# Patient Record
Sex: FEMALE | Race: OTHER | Employment: FULL TIME | URBAN - METROPOLITAN AREA
[De-identification: names, ages, dates, MRNs, and addresses within clinical notes are randomized per-mention and may not be internally consistent; named-entity substitution may affect disease eponyms.]

---

## 2024-05-01 ENCOUNTER — HOSPITAL ENCOUNTER (INPATIENT)
Facility: HOSPITAL | Age: 44
LOS: 2 days | Discharge: HOME | End: 2024-05-03
Attending: EMERGENCY MEDICINE | Admitting: STUDENT IN AN ORGANIZED HEALTH CARE EDUCATION/TRAINING PROGRAM
Payer: MEDICAID

## 2024-05-01 ENCOUNTER — APPOINTMENT (OUTPATIENT)
Dept: CT IMAGING | Age: 44
End: 2024-05-01
Attending: EMERGENCY MEDICINE
Payer: MEDICAID

## 2024-05-01 DIAGNOSIS — K57.20 DIVERTICULITIS OF LARGE INTESTINE WITH ABSCESS WITHOUT BLEEDING: Primary | ICD-10-CM

## 2024-05-01 LAB
ALBUMIN SERPL-MCNC: 3.7 G/DL (ref 3.4–5)
ALBUMIN/GLOB SERPL: 0.8 {RATIO} (ref 1–2)
ALP LIVER SERPL-CCNC: 143 U/L
ALT SERPL-CCNC: 81 U/L
ANION GAP SERPL CALC-SCNC: 5 MMOL/L (ref 0–18)
AST SERPL-CCNC: 61 U/L (ref 15–37)
B-HCG UR QL: NEGATIVE
BASOPHILS # BLD AUTO: 0.05 X10(3) UL (ref 0–0.2)
BASOPHILS NFR BLD AUTO: 0.4 %
BILIRUB SERPL-MCNC: 1.7 MG/DL (ref 0.1–2)
BILIRUB UR QL STRIP.AUTO: NEGATIVE
BUN BLD-MCNC: 7 MG/DL (ref 9–23)
CALCIUM BLD-MCNC: 8.8 MG/DL (ref 8.5–10.1)
CHLORIDE SERPL-SCNC: 108 MMOL/L (ref 98–112)
CLARITY UR REFRACT.AUTO: CLEAR
CO2 SERPL-SCNC: 24 MMOL/L (ref 21–32)
COLOR UR AUTO: YELLOW
CREAT BLD-MCNC: 0.62 MG/DL
EGFRCR SERPLBLD CKD-EPI 2021: 111 ML/MIN/1.73M2 (ref 60–?)
EOSINOPHIL # BLD AUTO: 0.05 X10(3) UL (ref 0–0.7)
EOSINOPHIL NFR BLD AUTO: 0.4 %
ERYTHROCYTE [DISTWIDTH] IN BLOOD BY AUTOMATED COUNT: 12.2 %
GLOBULIN PLAS-MCNC: 4.4 G/DL (ref 2.8–4.4)
GLUCOSE BLD-MCNC: 120 MG/DL (ref 70–99)
GLUCOSE UR STRIP.AUTO-MCNC: NEGATIVE MG/DL
HCT VFR BLD AUTO: 41.1 %
HGB BLD-MCNC: 14.2 G/DL
IMM GRANULOCYTES # BLD AUTO: 0.03 X10(3) UL (ref 0–1)
IMM GRANULOCYTES NFR BLD: 0.3 %
KETONES UR STRIP.AUTO-MCNC: NEGATIVE MG/DL
LEUKOCYTE ESTERASE UR QL STRIP.AUTO: NEGATIVE
LIPASE SERPL-CCNC: 33 U/L (ref 13–75)
LYMPHOCYTES # BLD AUTO: 0.98 X10(3) UL (ref 1–4)
LYMPHOCYTES NFR BLD AUTO: 8.8 %
MCH RBC QN AUTO: 31.3 PG (ref 26–34)
MCHC RBC AUTO-ENTMCNC: 34.5 G/DL (ref 31–37)
MCV RBC AUTO: 90.5 FL
MONOCYTES # BLD AUTO: 0.63 X10(3) UL (ref 0.1–1)
MONOCYTES NFR BLD AUTO: 5.6 %
NEUTROPHILS # BLD AUTO: 9.46 X10 (3) UL (ref 1.5–7.7)
NEUTROPHILS # BLD AUTO: 9.46 X10(3) UL (ref 1.5–7.7)
NEUTROPHILS NFR BLD AUTO: 84.5 %
NITRITE UR QL STRIP.AUTO: NEGATIVE
OSMOLALITY SERPL CALC.SUM OF ELEC: 283 MOSM/KG (ref 275–295)
PH UR STRIP.AUTO: 7 [PH] (ref 5–8)
PLATELET # BLD AUTO: 244 10(3)UL (ref 150–450)
POTASSIUM SERPL-SCNC: 3.7 MMOL/L (ref 3.5–5.1)
PROT SERPL-MCNC: 8.1 G/DL (ref 6.4–8.2)
PROT UR STRIP.AUTO-MCNC: NEGATIVE MG/DL
RBC # BLD AUTO: 4.54 X10(6)UL
SODIUM SERPL-SCNC: 137 MMOL/L (ref 136–145)
SP GR UR STRIP.AUTO: 1.02 (ref 1–1.03)
UROBILINOGEN UR STRIP.AUTO-MCNC: 0.2 MG/DL
WBC # BLD AUTO: 11.2 X10(3) UL (ref 4–11)

## 2024-05-01 PROCEDURE — 99222 1ST HOSP IP/OBS MODERATE 55: CPT | Performed by: STUDENT IN AN ORGANIZED HEALTH CARE EDUCATION/TRAINING PROGRAM

## 2024-05-01 PROCEDURE — 74177 CT ABD & PELVIS W/CONTRAST: CPT | Performed by: EMERGENCY MEDICINE

## 2024-05-01 RX ORDER — HYDROMORPHONE HYDROCHLORIDE 1 MG/ML
0.8 INJECTION, SOLUTION INTRAMUSCULAR; INTRAVENOUS; SUBCUTANEOUS EVERY 2 HOUR PRN
Status: DISCONTINUED | OUTPATIENT
Start: 2024-05-01 | End: 2024-05-03

## 2024-05-01 RX ORDER — LEVOFLOXACIN 5 MG/ML
750 INJECTION, SOLUTION INTRAVENOUS EVERY 24 HOURS
Status: DISCONTINUED | OUTPATIENT
Start: 2024-05-01 | End: 2024-05-03

## 2024-05-01 RX ORDER — POLYETHYLENE GLYCOL 3350 17 G/17G
17 POWDER, FOR SOLUTION ORAL DAILY PRN
Status: DISCONTINUED | OUTPATIENT
Start: 2024-05-01 | End: 2024-05-03

## 2024-05-01 RX ORDER — ACETAMINOPHEN 500 MG
1000 TABLET ORAL EVERY 8 HOURS SCHEDULED
Status: DISCONTINUED | OUTPATIENT
Start: 2024-05-01 | End: 2024-05-03

## 2024-05-01 RX ORDER — ENOXAPARIN SODIUM 100 MG/ML
40 INJECTION SUBCUTANEOUS DAILY
Status: DISCONTINUED | OUTPATIENT
Start: 2024-05-02 | End: 2024-05-03

## 2024-05-01 RX ORDER — ONDANSETRON 2 MG/ML
4 INJECTION INTRAMUSCULAR; INTRAVENOUS EVERY 6 HOURS PRN
Status: DISCONTINUED | OUTPATIENT
Start: 2024-05-01 | End: 2024-05-03

## 2024-05-01 RX ORDER — METRONIDAZOLE 500 MG/100ML
500 INJECTION, SOLUTION INTRAVENOUS EVERY 8 HOURS
Status: DISCONTINUED | OUTPATIENT
Start: 2024-05-01 | End: 2024-05-03

## 2024-05-01 RX ORDER — HYDROMORPHONE HYDROCHLORIDE 1 MG/ML
0.4 INJECTION, SOLUTION INTRAMUSCULAR; INTRAVENOUS; SUBCUTANEOUS EVERY 2 HOUR PRN
Status: DISCONTINUED | OUTPATIENT
Start: 2024-05-01 | End: 2024-05-03

## 2024-05-01 RX ORDER — KETOROLAC TROMETHAMINE 15 MG/ML
15 INJECTION, SOLUTION INTRAMUSCULAR; INTRAVENOUS ONCE
Status: COMPLETED | OUTPATIENT
Start: 2024-05-01 | End: 2024-05-01

## 2024-05-01 RX ORDER — OXYCODONE HYDROCHLORIDE 5 MG/1
5 TABLET ORAL EVERY 4 HOURS PRN
Status: DISCONTINUED | OUTPATIENT
Start: 2024-05-01 | End: 2024-05-03

## 2024-05-01 RX ORDER — MORPHINE SULFATE 4 MG/ML
4 INJECTION, SOLUTION INTRAMUSCULAR; INTRAVENOUS ONCE
Status: COMPLETED | OUTPATIENT
Start: 2024-05-01 | End: 2024-05-01

## 2024-05-01 RX ORDER — OXYCODONE HYDROCHLORIDE 10 MG/1
10 TABLET ORAL EVERY 4 HOURS PRN
Status: DISCONTINUED | OUTPATIENT
Start: 2024-05-01 | End: 2024-05-03

## 2024-05-01 RX ORDER — KETOROLAC TROMETHAMINE 30 MG/ML
30 INJECTION, SOLUTION INTRAMUSCULAR; INTRAVENOUS EVERY 6 HOURS
Status: COMPLETED | OUTPATIENT
Start: 2024-05-01 | End: 2024-05-02

## 2024-05-01 RX ORDER — PROCHLORPERAZINE EDISYLATE 5 MG/ML
5 INJECTION INTRAMUSCULAR; INTRAVENOUS EVERY 8 HOURS PRN
Status: DISCONTINUED | OUTPATIENT
Start: 2024-05-01 | End: 2024-05-03

## 2024-05-01 RX ORDER — SODIUM CHLORIDE 9 MG/ML
INJECTION, SOLUTION INTRAVENOUS CONTINUOUS
Status: DISCONTINUED | OUTPATIENT
Start: 2024-05-01 | End: 2024-05-02

## 2024-05-01 RX ORDER — KETOROLAC TROMETHAMINE 15 MG/ML
15 INJECTION, SOLUTION INTRAMUSCULAR; INTRAVENOUS ONCE
Status: DISCONTINUED | OUTPATIENT
Start: 2024-05-01 | End: 2024-05-01

## 2024-05-01 RX ORDER — ENEMA 19; 7 G/133ML; G/133ML
1 ENEMA RECTAL ONCE AS NEEDED
Status: DISCONTINUED | OUTPATIENT
Start: 2024-05-01 | End: 2024-05-03

## 2024-05-01 RX ORDER — ONDANSETRON 2 MG/ML
4 INJECTION INTRAMUSCULAR; INTRAVENOUS ONCE
Status: COMPLETED | OUTPATIENT
Start: 2024-05-01 | End: 2024-05-01

## 2024-05-01 RX ORDER — SENNOSIDES 8.6 MG
17.2 TABLET ORAL NIGHTLY PRN
Status: DISCONTINUED | OUTPATIENT
Start: 2024-05-01 | End: 2024-05-03

## 2024-05-01 RX ORDER — METRONIDAZOLE 500 MG/100ML
500 INJECTION, SOLUTION INTRAVENOUS ONCE
Status: COMPLETED | OUTPATIENT
Start: 2024-05-01 | End: 2024-05-01

## 2024-05-01 RX ORDER — BISACODYL 10 MG
10 SUPPOSITORY, RECTAL RECTAL
Status: DISCONTINUED | OUTPATIENT
Start: 2024-05-01 | End: 2024-05-03

## 2024-05-01 RX ORDER — IBUPROFEN 200 MG
600 TABLET ORAL EVERY 6 HOURS SCHEDULED
Status: DISCONTINUED | OUTPATIENT
Start: 2024-05-02 | End: 2024-05-03

## 2024-05-01 RX ORDER — DEXTROSE MONOHYDRATE, SODIUM CHLORIDE, AND POTASSIUM CHLORIDE 50; 1.49; 4.5 G/1000ML; G/1000ML; G/1000ML
INJECTION, SOLUTION INTRAVENOUS CONTINUOUS
Status: DISCONTINUED | OUTPATIENT
Start: 2024-05-01 | End: 2024-05-01

## 2024-05-01 NOTE — H&P
[facility]  Report of History and Physical Exam    [place] Patient Status:  Inpatient    1980 MRN [id number]   Location [facility] 1NE-A Attending Katharine Ojeda MD   Hosp Day # 0 PCP Wilfred Huerta MD     Chief Complaint:   Lower abdominal pain    History of Present Illness:  Kirby Park is a a(n) 44 year old female who presented to the [facility] with approximately one week of worsening abdominal pain.     The patient states her pain began approximately one week in the lower abdomen. She states her pain is the worst in the left lower quadrant. She describes her pain as a cramping pain. She has been managing her pain with tylenol.     The patient states yesterday, the pain woke her from sleep and she was requiring tylenol every 2-3 hours. Her persistent worsening pain is why she ultimately presented to the emergency department.     She has had some associated nausea. She denies vomiting. She denies fevers or chills. She had some worsening abdominal pain with bowel movements. She is passing flatus.     Upon presentation to the emergency department, CT scan of the abdomen and pelvis revealed acute sigmoid diverticulitis.  There is an area of distinct low-attenuation measuring up to 1.9 cm along the proximal aspect of the diverticulitis segment consistent with a developing intramural or subserosal abscess.  It is not drainable.  Small amount of free fluid in the left lower quadrant and lower midline pelvis.  No free air.  All other significant findings may be seen in the radiologist report.    The patient is hemodynamically stable.  She is afebrile.  Her vital signs are stable.  Slight leukocytosis at 11.2 with a left shift.  Hemoglobin 14.2.  Platelets 244,000.  AST, ALT and alkaline phosphatase are all elevated at 61, 81 and 143 respectively.  Electrolytes are within normal limits.  Lipase is within normal limits at 33.    The patient has no significant past medical  history.    The patient has a past surgical history significant for  section 28 years ago.        History:  History reviewed. No pertinent past medical history.  Past Surgical History:   Procedure Laterality Date           No family history on file.   reports that she has never smoked. She has never used smokeless tobacco. She reports current alcohol use. She reports that she does not use drugs.    Allergies:  No Known Allergies    Medications:    Current Facility-Administered Medications:     sodium chloride 0.9% infusion, , Intravenous, Continuous    potassium chloride 20 mEq in dextrose 5%-sodium chloride 0.45% 1000mL infusion premix, , Intravenous, Continuous    [START ON 2024] enoxaparin (Lovenox) 40 MG/0.4ML SUBQ injection 40 mg, 40 mg, Subcutaneous, Daily    ondansetron (Zofran) 4 MG/2ML injection 4 mg, 4 mg, Intravenous, Q6H PRN    prochlorperazine (Compazine) 10 MG/2ML injection 5 mg, 5 mg, Intravenous, Q8H PRN    polyethylene glycol (PEG 3350) (Miralax) 17 g oral packet 17 g, 17 g, Oral, Daily PRN    sennosides (Senokot) tab 17.2 mg, 17.2 mg, Oral, Nightly PRN    bisacodyl (Dulcolax) 10 MG rectal suppository 10 mg, 10 mg, Rectal, Daily PRN    fleet enema (Fleet) 7-19 GM/118ML rectal enema 133 mL, 1 enema, Rectal, Once PRN    acetaminophen (Tylenol Extra Strength) tab 1,000 mg, 1,000 mg, Oral, Q8H СЕРГЕЙ    ketorolac (Toradol) 30 MG/ML injection 30 mg, 30 mg, Intravenous, Q6H **FOLLOWED BY** [START ON 2024] ibuprofen (Motrin) tab 600 mg, 600 mg, Oral, 4 times per day    oxyCODONE immediate release tab 5 mg, 5 mg, Oral, Q4H PRN **OR** oxyCODONE immediate release tab 10 mg, 10 mg, Oral, Q4H PRN    HYDROmorphone (Dilaudid) 1 MG/ML injection 0.4 mg, 0.4 mg, Intravenous, Q2H PRN **OR** HYDROmorphone (Dilaudid) 1 MG/ML injection 0.8 mg, 0.8 mg, Intravenous, Q2H PRN    pantoprazole (Protonix) 40 mg in sodium chloride 0.9% PF 10 mL IV push, 40 mg, Intravenous, Q24H    Review of  Systems:  Pertinent items are noted in HPI.  Allergic/Immuno:  Review of patient's allergies performed.  Cardiovascular:  Negative for cool extremity and irregular heartbeat/palpitations  Constitutional:  Negative for decreased activity, fever, irritability and lethargy  Endocrine:  Negative for abnormal sleep patterns, increased activity, polydipsia and polyphagia  ENMT:  Negative for ear drainage, hearing loss and nasal drainage  Eyes:  Negative for eye discharge and vision loss  Gastrointestinal: Positive for abdominal pain  Genitourinary:  Negative for dysuria and hematuria  Hema/Lymph:  Negative for easy bleeding and easy bruising  Integumentary:  Negative for pruritus and rash  Musculoskeletal:  Negative for bone/joint symptoms  Neurological:  Negative for gait disturbance  Psychiatric:  Negative for inappropriate interaction and psychiatric symptoms  Respiratory:  Negative for cough, dyspnea and wheezing      Physical Exam:  Blood pressure 104/69, pulse 63, temperature 98.7 °F (37.1 °C), temperature source Oral, resp. rate 18, height 64\", weight 170 lb (77.1 kg), SpO2 100%.    General: Alert, orientated x3.  Cooperative.  No apparent distress.    HEENT: Exam is unremarkable.  Without scleral icterus.  Mucous membranes are moist. EOM are WNL.    Neck: No tenderness to palpitation.  Full range of motion to flexion and extension, lateral rotation and lateral flexion of cervical spine.  No JVD. Supple.     Lungs:  No secondary use of accessory respiratory musculature.    Abdomen: Soft, nondistended without tympany to percussion.  Abdomen is tender to palpation throughout the lower abdomen with it being most significant in the left lower quadrant.  No rebound or guarding.    Extremities:  No lower extremity edema noted.  Without clubbing or cyanosis.      Skin: Normal texture and turgor.      Laboratory Data and Relevant X-rays:  Recent Labs   Lab 05/01/24  0901   RBC 4.54   HGB 14.2   HCT 41.1   MCV 90.5   MCH  31.3   MCHC 34.5   RDW 12.2   NEPRELIM 9.46*   WBC 11.2*   .0       Recent Labs   Lab 05/01/24  0901   *   BUN 7*   CREATSERUM 0.62   CA 8.8   ALB 3.7      K 3.7      CO2 24.0   ALKPHO 143*   AST 61*   ALT 81*   BILT 1.7   TP 8.1         No results for input(s): \"PTP\", \"INR\", \"PTT\" in the last 168 hours.    Imaging    Narrative   PROCEDURE:  CT ABDOMEN+PELVIS (CONTRAST ONLY) (CPT=74177)     COMPARISON:  None.     INDICATIONS:  LLQ pain     TECHNIQUE:  CT scanning was performed from the dome of the diaphragm to the pubic symphysis with non-ionic intravenous contrast material. Post contrast coronal MPR imaging was performed.  Dose reduction techniques were used. Dose information is  transmitted to the ACR (American College of Radiology) NRDR (National Radiology Data Registry) which includes the Dose Index Registry.     PATIENT STATED HISTORY:(As transcribed by Technologist)  Patient has left lower abdominal pain since Saturday.      CONTRAST USED:  100cc of Isovue 370     FINDINGS:          LIVER:  Unremarkable.     BILIARY:  Unremarkable.     PANCREAS:  Unremarkable.     SPLEEN:  Small accessory splenic nodule posterior to the spleen     KIDNEYS:  Nonobstructing bilateral kidney stones.  Small cyst.  No acute process involving either kidney, no hydronephrosis.     ADRENALS:  Unremarkable.     AORTA/VASCULAR:  No aortic aneurysm.     RETROPERITONEUM:  Unremarkable.     BOWEL/MESENTERY:  Acute diverticulitis involving the proximal sigmoid colon, with extensive inflammatory changes, markedly thickened colon, extensive pericolonic stranding, and numerous diverticula.  The length of the abnormal segment is about 7 cm.    Along the inferior aspect of the proximal portion of this inflammatory process series 2, image 82, and series 601, image 48, there is a distinct measurable low-attenuation area appearing distinct from the rest of the phlegmonous changes measuring 19 x 11   x 13 mm, which may  reflect a subtle early intramural or subserosal abscess involving the colon in this region.  However there is no large or percutaneous-drainable abscess formation at this time.  Small free fluid is seen in the left lower quadrant and  in the lower midline pelvis superior to the bladder and amid numerous loops of small bowel.  There may be some early membrane enhancement of this fluid, but at this time there is no defined abscess or thick-walled collection.  No gas bubbles within this  fluid.          ABDOMINAL WALL:  Unremarkable.     URINARY BLADDER:  Not well distended, small amount of urine, with wall thickening present.     LYMPH NODES PELVIS:  Unremarkable.     PELVIC ORGANS:  IUD.  Heterogeneous attenuation of the uterus may reflect small subtle fibroid changes.     LUNG BASES:  No acute process.     BONES:  No acute abnormality.     Impression   CONCLUSION:       1. Acute sigmoid diverticulitis.  The inflammatory changes are extensive, and there is an area of distinct low-attenuation measuring up to 1.9 cm along the proximal aspect of the diverticulitis segment which may reflect a developing intramural or  subserosal abscess, but no large or percutaneous-drainable appearing abscess formation at this time.       2. Small-moderate free fluid in the left lower quadrant and lower midline pelvis, amid numerous loops of small bowel.  There may be some early membrane enhancement of this fluid, and potential for early organization, but at this time there is no sign of  defined abscess, or thick-walled collection, or gas bubbles within the fluid.     3. Urinary bladder is not well distended with urine, but shows wall thickening and may be inflamed secondary to the adjacent diverticulitis.     4. Nonobstructing bilateral kidney stones.  IUD.  Heterogeneous attenuation of the uterus may reflect subtle fibroid changes.          LOCATION:  [facility]        Dictated by (CST): Sven Almanzar MD on 5/01/2024 at 10:15 AM       Finalized by (CST): Sven Almanzar MD on 5/01/2024 at 10:21 AM       Chikis Shields PA-C  5/1/2024  3:36 PM    Is this a shared or split note between Advanced Practice Provider and Physician? Yes      Impression:  Patient Active Problem List   Diagnosis    Diverticulitis of large intestine with abscess without bleeding       This is a 44-year-old woman with acute diverticulitis    Patient reports feeling left lower abdominal pain  Pain got progressively worse, prompting her to present to the emergency room  CT imaging was reviewed by me and showed thickening of the sigmoid colon with potential developing intramural abscess  There was no free fluid or free air    No acute surgical intervention at this time  We will do conservative treatment with IV fluids, bowel rest, and antibiotics  Patient can have ice chips  If her pain worsens with ice chips, patient was instructed to stop    I discussed with the patient her diagnosis of diverticulitis  If she has improvement of her pain, we can continue conservative management  If she has worsening of her symptoms including abdominal pain and leukocytosis, patient may need surgical intervention    DVT prophylaxis  GI prophylactics    Thank you for letting me participate in the care of this patient  Katharine Ojeda MD  [facility] [facility]  5/1/2024  8:22 PM

## 2024-05-01 NOTE — ED QUICK NOTES
Pt's fluids were paused at this time as patient will be leaving by private vechile to the main hospital floor RN aware that fluids are stopped at this time

## 2024-05-01 NOTE — ED INITIAL ASSESSMENT (HPI)
Pt started having left lower abd pain yesterday, no vomiting no fever, no diarrhea. Normal bowel movement was today

## 2024-05-01 NOTE — ED QUICK NOTES
Rounding Completed    Plan of Care reviewed. Waiting for CT results.  Elimination needs assessed.      Bed is locked and in lowest position. Call light within reach.

## 2024-05-01 NOTE — ED PROVIDER NOTES
Patient Seen in: [facility] In [place]      History     Chief Complaint   Patient presents with    Abdomen/Flank Pain     Stated Complaint: Lower L abdominal pain since yesterday    Subjective:   HPI    44-year-old female presents with left lower quadrant abdominal pain that initially started about 4 days ago.  Patient states that it was mild and intermittent at first but has been more severe and constant since yesterday.  She felt feverish yesterday but has not checked her temperature.  She denies nausea or vomiting.  She had a normal bowel movement today.  Denies urinary symptoms.  Patient states she did have an episode of diverticulitis about 10 years ago.    Objective:   History reviewed. No pertinent past medical history.           History reviewed. No pertinent surgical history.             Social History     Socioeconomic History    Marital status: Single              Review of Systems    Positive for stated complaint: Lower L abdominal pain since yesterday  Other systems are as noted in HPI.  Constitutional and vital signs reviewed.      All other systems reviewed and negative except as noted above.    Physical Exam     ED Triage Vitals   BP 05/01/24 0857 117/61   Pulse 05/01/24 0857 75   Resp 05/01/24 0857 (!) 99   Temp 05/01/24 0857 98.7 °F (37.1 °C)   Temp src 05/01/24 0857 Oral   SpO2 05/01/24 0857 98 %   O2 Device 05/01/24 0914 None (Room air)       Current:/62   Pulse 71   Temp 98.7 °F (37.1 °C) (Oral)   Resp 18   Ht 162.6 cm (5' 4\")   Wt 77.1 kg   SpO2 100%   BMI 29.18 kg/m²         Physical Exam  Vitals and nursing note reviewed.   Constitutional:       Appearance: She is well-developed.   HENT:      Head: Normocephalic and atraumatic.      Mouth/Throat:      Mouth: Mucous membranes are moist.   Eyes:      General: No scleral icterus.  Cardiovascular:      Rate and Rhythm: Normal rate and regular rhythm.   Pulmonary:      Effort: Pulmonary effort is normal.       Breath sounds: Normal breath sounds.   Abdominal:      General: There is no distension.      Palpations: Abdomen is soft.      Tenderness: There is abdominal tenderness. There is guarding. There is no rebound.      Comments: Left lower quadrant tenderness   Skin:     General: Skin is warm and dry.   Neurological:      General: No focal deficit present.      Mental Status: She is alert and oriented to person, place, and time.      Cranial Nerves: No cranial nerve deficit.      Motor: No weakness.   Psychiatric:         Mood and Affect: Mood normal.         Behavior: Behavior normal.               ED Course     Labs Reviewed   COMP METABOLIC PANEL (14) - Abnormal; Notable for the following components:       Result Value    Glucose 120 (*)     BUN 7 (*)     AST 61 (*)     ALT 81 (*)     Alkaline Phosphatase 143 (*)     A/G Ratio 0.8 (*)     All other components within normal limits   URINALYSIS WITH CULTURE REFLEX - Abnormal; Notable for the following components:    Blood Urine Moderate (*)     All other components within normal limits   UA MICROSCOPIC ONLY, URINE - Abnormal; Notable for the following components:    RBC Urine 6-10 (*)     Bacteria Urine Rare (*)     Squamous Epi. Cells Many (*)     All other components within normal limits   CBC W/ DIFFERENTIAL - Abnormal; Notable for the following components:    WBC 11.2 (*)     Neutrophil Absolute Prelim 9.46 (*)     Neutrophil Absolute 9.46 (*)     Lymphocyte Absolute 0.98 (*)     All other components within normal limits   LIPASE - Normal   POCT PREGNANCY URINE - Normal   CBC WITH DIFFERENTIAL WITH PLATELET    Narrative:     The following orders were created for panel order CBC With Differential With Platelet.  Procedure                               Abnormality         Status                     ---------                               -----------         ------                     CBC W/ DIFFERENTIAL[[id number]]          Abnormal            Final result                  Please view results for these tests on the individual orders.             CT ABDOMEN+PELVIS(CONTRAST ONLY)(CPT=74177)    Result Date: 5/1/2024  CONCLUSION:   1. Acute sigmoid diverticulitis.  The inflammatory changes are extensive, and there is an area of distinct low-attenuation measuring up to 1.9 cm along the proximal aspect of the diverticulitis segment which may reflect a developing intramural or subserosal abscess, but no large or percutaneous-drainable appearing abscess formation at this time.   2. Small-moderate free fluid in the left lower quadrant and lower midline pelvis, amid numerous loops of small bowel.  There may be some early membrane enhancement of this fluid, and potential for early organization, but at this time there is no sign of defined abscess, or thick-walled collection, or gas bubbles within the fluid.  3. Urinary bladder is not well distended with urine, but shows wall thickening and may be inflamed secondary to the adjacent diverticulitis.  4. Nonobstructing bilateral kidney stones.  IUD.  Heterogeneous attenuation of the uterus may reflect subtle fibroid changes.    LOCATION:  [facility]   Dictated by (CST): Sven Almanzar MD on 5/01/2024 at 10:15 AM     Finalized by (CST): Sven Almanzar MD on 5/01/2024 at 10:21 AM               MDM   44-year-old female presents with left lower quadrant abdominal pain that initially started about 4 days ago    Differential includes but is not limited to diverticulitis, bowel perforation, abscess,, appendicitis, ovarian cyst, ureteral calculus    Labs show mildly elevated WBC 11.2, otherwise unremarkable.    Independent interpretation of CT abdomen/pelvis shows diverticulitis with adjacent fluid collection in the sigmoid area.  Radiology report reviewed as above noting extensive entry changes with a area of distinct low-attenuation measuring 1.9 cm which may represent a developing intramural or subserosal abscess.    Will admit for further management.   Discussed with general surgery JAMAL Fields for Dr. Ojeda.  Will treat with ceftriaxone and Flagyl.    Medical Decision Making  Amount and/or Complexity of Data Reviewed  Labs: ordered. Decision-making details documented in ED Course.  Radiology: ordered and independent interpretation performed. Decision-making details documented in ED Course.  Discussion of management or test interpretation with external provider(s): General surgery    Risk  Parenteral controlled substances.  Decision regarding hospitalization.        Disposition and Plan     Clinical Impression:  1. Diverticulitis of large intestine with abscess without bleeding         Disposition:  Admit  5/1/2024 10:43 am    Follow-up:  No follow-up provider specified.        Medications Prescribed:  There are no discharge medications for this patient.                        Hospital Problems       Present on Admission             ICD-10-CM Noted POA    * (Principal) Diverticulitis of large intestine with abscess without bleeding K57.20 5/1/2024 Unknown

## 2024-05-01 NOTE — ED QUICK NOTES
Orders for admission, patient is aware of plan and ready to go upstairs. Any questions, please call ED RN Mira  at extension [phone].     Vaccinated?  Type of COVID test sent:  COVID Suspicion level: Low/High      Titratable drug(s) infusin.9 NS   Rate:125    LOC at time of transport:a/o x 4    Other pertinent information:has Rocephin infusing at this time, will need flagyl    CIWA score=0   NIH score= 0

## 2024-05-01 NOTE — ED QUICK NOTES
Pt's daughter here to transport the pt to the main hospital. IV was wrapped up. This RN offered pt a wheelchair and to be wheeled out of the ER to the car, pt declined. Pt was ambulatory with a steady gait out of the ER

## 2024-05-01 NOTE — PROGRESS NOTES
NURSING ADMISSION NOTE      Patient admitted via Wheelchair  Oriented to room.  Safety precautions initiated.  Bed in low position.  Call light in reach.    Pt admitted from  ED with diverticulitis, LLQ pain, tenderness to lower abd. +Nausea, no vomiting. AxO x4. RA. IVF. Up self. NPO, surgery to see. IV abx given in ED.

## 2024-05-01 NOTE — ED QUICK NOTES
Rounding Completed    Plan of Care reviewed. Waiting for inpatient bed assignment  .  Elimination needs assessed.  Pt states that her daughter will be taking her to the main hospital once the bed assignment is ready and report has been called. Warm blanket provided to the pt and lights were turned down for comfort    Bed is locked and in lowest position. Call light within reach.

## 2024-05-02 LAB
ERYTHROCYTE [DISTWIDTH] IN BLOOD BY AUTOMATED COUNT: 11.9 %
HCT VFR BLD AUTO: 36.7 %
HGB BLD-MCNC: 12.5 G/DL
MCH RBC QN AUTO: 31.2 PG (ref 26–34)
MCHC RBC AUTO-ENTMCNC: 34.1 G/DL (ref 31–37)
MCV RBC AUTO: 91.5 FL
PLATELET # BLD AUTO: 195 10(3)UL (ref 150–450)
RBC # BLD AUTO: 4.01 X10(6)UL
WBC # BLD AUTO: 8.2 X10(3) UL (ref 4–11)

## 2024-05-02 PROCEDURE — 99232 SBSQ HOSP IP/OBS MODERATE 35: CPT | Performed by: STUDENT IN AN ORGANIZED HEALTH CARE EDUCATION/TRAINING PROGRAM

## 2024-05-02 NOTE — PLAN OF CARE
Resumed care at 0730. Ambulating in room, in bathroom. IVF stopped per order. Tolerating clear liquids, advanced to full liquids. Denies nausea or pain.

## 2024-05-02 NOTE — PROGRESS NOTES
[place]  Progress Note    [place] Patient Status:  Inpatient    1980 MRN [id number]   Location [facility] 1NE-A Attending Katharine Ojeda MD   Hosp Day # 1 PCP Wilfred Huerta MD     Subjective:  The patient is doing well today.  She reports improvement in her abdominal pain.  She reports mild left lower quadrant discomfort.  She denies nausea or vomiting.  She is tolerating ice chips.  She denies flatus.  She denies a bowel movement.  She denies fever or chills.    Objective/Physical Exam:  General: Alert, orientated x3.  Cooperative.  No apparent distress.  Vital Signs:  Blood pressure 102/67, pulse 75, temperature 97.9 °F (36.6 °C), temperature source Oral, resp. rate 16, height 64\", weight 174 lb 2.6 oz (79 kg), SpO2 100%.  Lungs: No respiratory distress.  Cardiac: Regular rate and rhythm.   Abdomen:  Soft, non distended, non tender, with no rebound or guarding.  No peritoneal signs.   Extremities:  No lower extremity edema noted.        Labs:  Lab Results   Component Value Date    WBC 8.2 2024    HGB 12.5 2024    HCT 36.7 2024    .0 2024        No results found for: \"PT\", \"INR\"    I/O last 3 completed shifts:  In: 1400 [I.V.:1200; IV PIGGYBACK:200]  Out: -   No intake/output data recorded.    Assessment  Patient Active Problem List   Diagnosis    Diverticulitis of large intestine with abscess without bleeding     Acute diverticulitis      Plan:  No plan for urgent or emergent surgical intervention.  Start clear liquid diet.  Patient does well with clear liquids this morning, she may advance to full liquid diet this evening.  Continue IV antibiotics.  Ambulate and up to chair  Plan for discharge to home tomorrow patient continues to do well.    Theresa Velásquez PA-C  2024  9:50 AM    ADDENDUM:     Patient was seen and examined by me. I agree with the above note.  She reports improvement of her symptoms today.  She still feels soreness in the  left lower quadrant.  She is tolerating clear liquids without any nausea or vomiting.  She has been passing flatus but no bowel movements.    General: Alert, orientated x3. Cooperative. No apparent distress.  Pulmonary: non labored breathing, effort normal  Abdomen: Soft, non-distended, probably tender to palpation in the left lower quadrant  Extremities: warm, no edema or cyanosis        A/P 44 year old female with acute diverticulitis     No acute surgical intervention  Patient has improvement of her abdominal pain  She is tolerating clear liquids without any nausea vomiting or worsening abdominal pain  Continue IV antibiotics  If patient continues to tolerate liquids today has continued improvement of her abdominal pain, can advance to soft tomorrow  DVT prophylaxis  GI prophylaxis     This note was initiated by Theresa Velásquez PA-C.  The PA saw the patient in conjunction with me. The PA performed a history, exam, and developed the assessment and plan. I agree with the mentioned assessment and plan and have provided further documentation of my own, if necessary.       Katharine Ojeda MD  [facility] [place] [facility]  5/2/2024  4:07 PM

## 2024-05-02 NOTE — PLAN OF CARE
PT A/O, 96% ON RA, UP VOIDING IN BATHROOM, NO BMS, DENIES PAIN OR NAUSEA, IV ANTIBIOTICS GIVEN, IVF INFUSING, TOLERATING SIP/ICE CHIPS, INSTRUCTED PT ON POC    Problem: PAIN - ADULT  Goal: Verbalizes/displays adequate comfort level or patient's stated pain goal  Description: INTERVENTIONS:  - Encourage pt to monitor pain and request assistance  - Assess pain using appropriate pain scale  - Administer analgesics based on type and severity of pain and evaluate response  - Implement non-pharmacological measures as appropriate and evaluate response  - Consider cultural and social influences on pain and pain management  - Manage/alleviate anxiety  - Utilize distraction and/or relaxation techniques  - Monitor for opioid side effects  - Notify MD/LIP if interventions unsuccessful or patient reports new pain  - Anticipate increased pain with activity and pre-medicate as appropriate  Outcome: Progressing

## 2024-05-02 NOTE — PAYOR COMM NOTE
--------------  ADMISSION REVIEW     Payor: [facility]  Subscriber #:  [id number]  Authorization Number: [id number]    Admit date: 5/1/24  Admit time:  2:51 PM       REVIEW DOCUMENTATION:     ED Provider Notes        Subjective:   HPI    44-year-old female presents with left lower quadrant abdominal pain that initially started about 4 days ago.  Patient states that it was mild and intermittent at first but has been more severe and constant since yesterday.  She felt feverish yesterday but has not checked her temperature.  She denies nausea or vomiting.  She had a normal bowel movement today.  Denies urinary symptoms.  Patient states she did have an episode of diverticulitis about 10 years ago.        Physical Exam  Vitals and nursing note reviewed.   Constitutional:       Appearance: She is well-developed.   HENT:      Head: Normocephalic and atraumatic.      Mouth/Throat:      Mouth: Mucous membranes are moist.   Eyes:      General: No scleral icterus.  Cardiovascular:      Rate and Rhythm: Normal rate and regular rhythm.   Pulmonary:      Effort: Pulmonary effort is normal.      Breath sounds: Normal breath sounds.   Abdominal:      General: There is no distension.      Palpations: Abdomen is soft.      Tenderness: There is abdominal tenderness. There is guarding. There is no rebound.      Comments: Left lower quadrant tenderness   Skin:     General: Skin is warm and dry.   Neurological:      General: No focal deficit present.      Mental Status: She is alert and oriented to person, place, and time.      Cranial Nerves: No cranial nerve deficit.      Motor: No weakness.   Psychiatric:         Mood and Affect: Mood normal.         Behavior: Behavior normal.       ED Course     Labs Reviewed   COMP METABOLIC PANEL (14) - Abnormal; Notable for the following components:       Result Value    Glucose 120 (*)     BUN 7 (*)     AST 61 (*)     ALT 81 (*)     Alkaline Phosphatase 143 (*)      A/G Ratio 0.8 (*)     All other components within normal limits   URINALYSIS WITH CULTURE REFLEX - Abnormal; Notable for the following components:    Blood Urine Moderate (*)     All other components within normal limits   UA MICROSCOPIC ONLY, URINE - Abnormal; Notable for the following components:    RBC Urine 6-10 (*)     Bacteria Urine Rare (*)     Squamous Epi. Cells Many (*)     All other components within normal limits   CBC W/ DIFFERENTIAL - Abnormal; Notable for the following components:    WBC 11.2 (*)     Neutrophil Absolute Prelim 9.46 (*)     Neutrophil Absolute 9.46 (*)     Lymphocyte Absolute 0.98 (*)     All other components within normal limits   LIPASE - Normal   POCT PREGNANCY URINE - Normal   CBC WITH DIFFERENTIAL WITH PLATELET         CT ABDOMEN+PELVIS(CONTRAST ONLY)(CPT=74177)    Result Date: 5/1/2024  CONCLUSION:   1. Acute sigmoid diverticulitis.  The inflammatory changes are extensive, and there is an area of distinct low-attenuation measuring up to 1.9 cm along the proximal aspect of the diverticulitis segment which may reflect a developing intramural or subserosal abscess, but no large or percutaneous-drainable appearing abscess formation at this time.   2. Small-moderate free fluid in the left lower quadrant and lower midline pelvis, amid numerous loops of small bowel.  There may be some early membrane enhancement of this fluid, and potential for early organization, but at this time there is no sign of defined abscess, or thick-walled collection, or gas bubbles within the fluid.  3. Urinary bladder is not well distended with urine, but shows wall thickening and may be inflamed secondary to the adjacent diverticulitis.  4. Nonobstructing bilateral kidney stones.  IUD.  Heterogeneous attenuation of the uterus may reflect subtle fibroid changes.    LOCATION:  [facility]   Dictated by (CST): Sven Almanzar MD on 5/01/2024 at 10:15 AM     Finalized by (CST): Sven Almanzar MD on 5/01/2024 at  10:21 AM              MDM   44-year-old female presents with left lower quadrant abdominal pain that initially started about 4 days ago    Differential includes but is not limited to diverticulitis, bowel perforation, abscess,, appendicitis, ovarian cyst, ureteral calculus    Labs show mildly elevated WBC 11.2, otherwise unremarkable.    Independent interpretation of CT abdomen/pelvis shows diverticulitis with adjacent fluid collection in the sigmoid area.  Radiology report reviewed as above noting extensive entry changes with a area of distinct low-attenuation measuring 1.9 cm which may represent a developing intramural or subserosal abscess    Medical Decision Making    Amount and/or Complexity of Data Reviewed  Labs: ordered. Decision-making details documented in ED Course.  Radiology: ordered and independent interpretation performed. Decision-making details documented in ED Course.  Discussion of management or test interpretation with external provider(s): General surgery    Disposition and Plan     Clinical Impression:  1. Diverticulitis of large intestine with abscess without bleeding         Disposition:  Admit  2024 10:43 am      Report of History and Physical Exam           [place] Patient Status:  Inpatient    1980 MRN [id number]   [facility] within [facility] 1NE-A Attending Katharine Ojeda MD   Hosp Day # 0 PCP Wilfred Huerta MD      Chief Complaint:   Lower abdominal pain     History of Present Illness:  Kirby Park is a a(n) 44 year old female who presented to the [place] ER with approximately one week of worsening abdominal pain.      The patient states her pain began approximately one week in the lower abdomen. She states her pain is the worst in the left lower quadrant. She describes her pain as a cramping pain. She has been managing her pain with tylenol.      The patient states yesterday, the pain woke her from sleep and she was requiring tylenol every 2-3  hours. Her persistent worsening pain is why she ultimately presented to the emergency department.      She has had some associated nausea. She denies vomiting. She denies fevers or chills. She had some worsening abdominal pain with bowel movements. She is passing flatus.      Upon presentation to the emergency department, CT scan of the abdomen and pelvis revealed acute sigmoid diverticulitis.  There is an area of distinct low-attenuation measuring up to 1.9 cm along the proximal aspect of the diverticulitis segment consistent with a developing intramural or subserosal abscess.  It is not drainable.  Small amount of free fluid in the left lower quadrant and lower midline pelvis.  No free air.  All other significant findings may be seen in the radiologist report.     The patient is hemodynamically stable.  She is afebrile.  Her vital signs are stable.  Slight leukocytosis at 11.2 with a left shift.  Hemoglobin 14.2.  Platelets 244,000.  AST, ALT and alkaline phosphatase are all elevated at 61, 81 and 143 respectively.  Electrolytes are within normal limits.  Lipase is within normal limits at 33.     The patient has no significant past medical history.     The patient has a past surgical history significant for  section 28 years ago.      This is a 44-year-old woman with acute diverticulitis     Patient reports feeling left lower abdominal pain  Pain got progressively worse, prompting her to present to the emergency room  CT imaging was reviewed by me and showed thickening of the sigmoid colon with potential developing intramural abscess  There was no free fluid or free air     No acute surgical intervention at this time  We will do conservative treatment with IV fluids, bowel rest, and antibiotics  Patient can have ice chips  If her pain worsens with ice chips, patient was instructed to stop     I discussed with the patient her diagnosis of diverticulitis  If she has improvement of her pain, we can continue  conservative management  If she has worsening of her symptoms including abdominal pain and leukocytosis, patient may need surgical intervention     DVT prophylaxis  GI prophylactics     Thank you for letting me participate in the care of this patient  Katharine Ojeda MD      5/2 SURGERY:    Subjective:  The patient is doing well today.  She reports improvement in her abdominal pain.  She reports mild left lower quadrant discomfort.  She denies nausea or vomiting.  She is tolerating ice chips.  She denies flatus.  She denies a bowel movement.  She denies fever or chills.     Objective/Physical Exam:  General: Alert, orientated x3.  Cooperative.  No apparent distress.  Vital Signs:  Blood pressure 102/67, pulse 75, temperature 97.9 °F (36.6 °C), temperature source Oral, resp. rate 16, height 64\", weight 174 lb 2.6 oz (79 kg), SpO2 100%.  Lungs: No respiratory distress.  Cardiac: Regular rate and rhythm.   Abdomen:  Soft, non distended, non tender, with no rebound or guarding.  No peritoneal signs.   Extremities:  No lower extremity edema noted.          Labs:        Lab Results   Component Value Date     WBC 8.2 05/02/2024     HGB 12.5 05/02/2024     HCT 36.7 05/02/2024     .0 05/02/2024      No results found for: \"PT\", \"INR\"     I/O last 3 completed shifts:  In: 1400 [I.V.:1200; IV PIGGYBACK:200]  Out: -   No intake/output data recorded.     Assessment      Patient Active Problem List   Diagnosis    Diverticulitis of large intestine with abscess without bleeding      Acute diverticulitis        Plan:  No plan for urgent or emergent surgical intervention.  Start clear liquid diet.  Patient does well with clear liquids this morning, she may advance to full liquid diet this evening.  Continue IV antibiotics.  Ambulate and up to chair  Plan for discharge to home tomorrow patient continues to do well.     Theresa Velásquez PA-C  5/2/2024  9:50 AM       MEDICATIONS ADMINISTERED IN LAST 1 DAY:  acetaminophen (Tylenol  Extra Strength) tab 1,000 mg       Date Action Dose Route User    5/1/2024 2121 Given 1,000 mg Oral Arcadio Rhodes RN    5/1/2024 1553 Given 1,000 mg Oral Prehn, Sarah, RN          cefTRIAXone (Rocephin) 1 g in D5W 100 mL IVPB-ADD       Date Action Dose Route User    5/1/2024 1052 New Bag 1 g Intravenous Angelica Benitez RN          enoxaparin (Lovenox) 40 MG/0.4ML SUBQ injection 40 mg       Date Action Dose Route User    5/2/2024 0400 Given 40 mg Subcutaneous (Left Lower Abdomen) Mila Langston RN          iopamidol 76% (ISOVUE-370) injection for power injector       Date Action Dose Route User    5/1/2024 0947 Given 100 mL Intravenous (Left Antecubital) Jocelynn Serra          ketorolac (Toradol) 15 MG/ML injection 15 mg       Date Action Dose Route User    5/1/2024 0909 Given 15 mg Intravenous Mira Grier RN          ketorolac (Toradol) 30 MG/ML injection 30 mg       Date Action Dose Route User    5/2/2024 0401 Given 30 mg Intravenous Mila Langston RN    5/1/2024 2121 Given 30 mg Intravenous Arcadio Rhodes RN    5/1/2024 1553 Given 30 mg Intravenous Prehn, Sarah, RN          levoFLOXacin in dextrose 5% (Levaquin) 750 mg/150mL IVPB premix 750 mg       Date Action Dose Route User    5/1/2024 1640 New Bag 750 mg Intravenous Prehn, Sarah, RN          metRONIDAZOLE in sodium chloride 0.79% (Flagyl) 5 mg/mL IVPB premix 500 mg       Date Action Dose Route User    5/1/2024 1132 New Bag 500 mg Intravenous Mira Grier RN          metRONIDAZOLE in sodium chloride 0.79% (Flagyl) 5 mg/mL IVPB premix 500 mg       Date Action Dose Route User    5/2/2024 0401 New Bag 500 mg Intravenous Mila Langston RN    5/1/2024 1854 New Bag 500 mg Intravenous Prehn, Sarah, RN          morphINE PF 4 MG/ML injection 4 mg       Date Action Dose Route User    5/1/2024 1052 Given 4 mg Intravenous Eli, Ryane S, RN          ondansetron (Zofran) 4 MG/2ML injection 4 mg       Date Action Dose Route User    5/1/2024 0909 Given 4  mg Intravenous Mira Grier RN          ondansetron (Zofran) 4 MG/2ML injection 4 mg       Date Action Dose Route User    5/1/2024 1552 Given 4 mg Intravenous Prehn, Sarah, RN          pantoprazole (Protonix) 40 mg in sodium chloride 0.9% PF 10 mL IV push       Date Action Dose Route User    5/1/2024 1552 Given 40 mg Intravenous Prehn, Sarah, RN          prochlorperazine (Compazine) 10 MG/2ML injection 5 mg       Date Action Dose Route User    5/1/2024 1930 Given 5 mg Intravenous Prehn, Sarah, RN          sodium chloride 0.9% infusion       Date Action Dose Route User    5/2/2024 0401 New Bag (none) Intravenous Mila Langston RN    5/1/2024 1554 Rate/Dose Change (none) Intravenous Prehn, Sarah, RN    5/1/2024 0909 New Bag (none) Intravenous Mira Grier RN            Vitals (last day)       Date/Time Temp Pulse Resp BP SpO2 Weight O2 Device O2 Flow Rate (L/min) [facility]    05/01/24 2123 98 °F (36.7 °C) 71 18 111/62 96 % -- None (Room air) --     05/01/24 1608 98.6 °F (37 °C) 72 16 106/58 100 % -- None (Room air) --     05/01/24 1456 -- -- -- -- -- 170 lb -- --     05/01/24 1325 98.7 °F (37.1 °C) 63 18 104/69 100 % -- None (Room air) --     05/01/24 1235 -- 62 18 110/70 97 % -- None (Room air) --     05/01/24 1119 98.5 °F (36.9 °C) 73 18 115/73 98 % -- None (Room air) --     05/01/24 1009 -- 71 18 103/62 100 % -- -- --     05/01/24 0914 -- 78 18 -- 99 % -- None (Room air) --     05/01/24 0857 98.7 °F (37.1 °C) 75 99 117/61 98 % 170 lb -- -- SC

## 2024-05-03 VITALS
DIASTOLIC BLOOD PRESSURE: 78 MMHG | HEART RATE: 52 BPM | BODY MASS INDEX: 30.28 KG/M2 | WEIGHT: 177.38 LBS | HEIGHT: 64 IN | RESPIRATION RATE: 16 BRPM | SYSTOLIC BLOOD PRESSURE: 109 MMHG | OXYGEN SATURATION: 97 % | TEMPERATURE: 98 F

## 2024-05-03 LAB
BASOPHILS # BLD AUTO: 0.03 X10(3) UL (ref 0–0.2)
BASOPHILS NFR BLD AUTO: 0.6 %
EOSINOPHIL # BLD AUTO: 0.13 X10(3) UL (ref 0–0.7)
EOSINOPHIL NFR BLD AUTO: 2.6 %
ERYTHROCYTE [DISTWIDTH] IN BLOOD BY AUTOMATED COUNT: 11.9 %
HCT VFR BLD AUTO: 36 %
HGB BLD-MCNC: 11.7 G/DL
IMM GRANULOCYTES # BLD AUTO: 0.01 X10(3) UL (ref 0–1)
IMM GRANULOCYTES NFR BLD: 0.2 %
LYMPHOCYTES # BLD AUTO: 1.14 X10(3) UL (ref 1–4)
LYMPHOCYTES NFR BLD AUTO: 22.4 %
MCH RBC QN AUTO: 30.2 PG (ref 26–34)
MCHC RBC AUTO-ENTMCNC: 32.5 G/DL (ref 31–37)
MCV RBC AUTO: 93 FL
MONOCYTES # BLD AUTO: 0.46 X10(3) UL (ref 0.1–1)
MONOCYTES NFR BLD AUTO: 9.1 %
NEUTROPHILS # BLD AUTO: 3.31 X10 (3) UL (ref 1.5–7.7)
NEUTROPHILS # BLD AUTO: 3.31 X10(3) UL (ref 1.5–7.7)
NEUTROPHILS NFR BLD AUTO: 65.1 %
PLATELET # BLD AUTO: 212 10(3)UL (ref 150–450)
RBC # BLD AUTO: 3.87 X10(6)UL
WBC # BLD AUTO: 5.1 X10(3) UL (ref 4–11)

## 2024-05-03 PROCEDURE — 99231 SBSQ HOSP IP/OBS SF/LOW 25: CPT

## 2024-05-03 RX ORDER — AMOXICILLIN AND CLAVULANATE POTASSIUM 875; 125 MG/1; MG/1
1 TABLET, FILM COATED ORAL 2 TIMES DAILY
Qty: 28 TABLET | Refills: 0 | Status: SHIPPED | OUTPATIENT
Start: 2024-05-03 | End: 2024-05-17

## 2024-05-03 NOTE — PLAN OF CARE
Received patient awake and alert x4 sitting in the chair. C/o mild abd pain, no nausea. Clear lungs. Up ad ant. No fever. Tolerating full liquid diet. On IV abx.     Problem: Patient/Family Goals  Goal: Patient/Family Long Term Goal  Description: Patient's Long Term Goal: To go home    Interventions:  - Ambulate  - Tolerating advanced diet  - See additional Care Plan goals for specific interventions  Outcome: Progressing  Goal: Patient/Family Short Term Goal  Description: Patient's Short Term Goal: To feel better    Interventions:   - Decrease pain  - Decrease nausea, advance diet  - IV abx  - See additional Care Plan goals for specific interventions  Outcome: Progressing     Problem: PAIN - ADULT  Goal: Verbalizes/displays adequate comfort level or patient's stated pain goal  Description: INTERVENTIONS:  - Encourage pt to monitor pain and request assistance  - Assess pain using appropriate pain scale  - Administer analgesics based on type and severity of pain and evaluate response  - Implement non-pharmacological measures as appropriate and evaluate response  - Consider cultural and social influences on pain and pain management  - Manage/alleviate anxiety  - Utilize distraction and/or relaxation techniques  - Monitor for opioid side effects  - Notify MD/LIP if interventions unsuccessful or patient reports new pain  - Anticipate increased pain with activity and pre-medicate as appropriate  Outcome: Progressing

## 2024-05-03 NOTE — PROGRESS NOTES
[facility]  Progress Note    K[place] Park Patient Status:  Inpatient    1980 MRN [id number]   Location [facility] 1NE-A Attending Katharine Ojeda MD   Hosp Day # 2 PCP Wilfred Huerta MD     Subjective:  The patient is resting in bed.  She reports feeling much better today she reports minimal abdominal pain.  She denies nausea or vomiting.  She reports passing flatus and having a bowel movement earlier today.  She tolerated a clear liquid diet without issues.    Objective/Physical Exam:  General: Alert, orientated x3.  Cooperative.  No apparent distress.  Vital Signs:  Blood pressure 119/75, pulse 52, temperature 98.1 °F (36.7 °C), temperature source Oral, resp. rate 16, height 64\", weight 177 lb 6.4 oz (80.5 kg), SpO2 97%.  HEENT: Normocephalic, atraumatic. No scleral icterus.  Abdomen:  Soft, non-distended, minimally tender to palpation in left lower quadrant, with no rebound or guarding.  No peritoneal signs.    Labs:  CBC:    Lab Results   Component Value Date    WBC 5.1 2024    WBC 8.2 2024    WBC 11.2 (H) 2024     Lab Results   Component Value Date    HGB 11.7 (L) 2024    HGB 12.5 2024    HGB 14.2 2024      Lab Results   Component Value Date    .0 2024    .0 2024    .0 2024         Assessment/Plan:  Patient Active Problem List   Diagnosis    Diverticulitis of large intestine with abscess without bleeding       Acute diverticulitis    Advance to soft diet.  She is stable for discharge home from a general surgery standpoint if she tolerates a soft diet.  She will be discharged home with a 2-week course of antibiotics.  She is to follow-up with Dr. Ojeda in 2 weeks.    JAMAL Amin  5/3/2024  9:05 AM

## 2024-05-03 NOTE — PLAN OF CARE
NURSING DISCHARGE NOTE    Discharged Home via Wheelchair.  Accompanied by Family member  Belongings Taken by patient/family.    Patient given order to to discharge home. All discharge teaching done at bedside. RN dicussed with pt follow up care, new medications to start, home medications to resume, home care, soft food diet and s/s of when to return to the ED/call 911. Iv removed and pt was discharged home in stable condition.

## 2024-05-03 NOTE — PLAN OF CARE
Problem: Patient/Family Goals  Goal: Patient/Family Long Term Goal  Description: Patient's Long Term Goal: To go home    Interventions:  - Ambulate  - Tolerating advanced diet  - See additional Care Plan goals for specific interventions  Outcome: Adequate for Discharge  Goal: Patient/Family Short Term Goal  Description: Patient's Short Term Goal: To feel better    Interventions:   - Decrease pain  - Decrease nausea, advance diet  - IV abx  - See additional Care Plan goals for specific interventions  Outcome: Adequate for Discharge     Problem: PAIN - ADULT  Goal: Verbalizes/displays adequate comfort level or patient's stated pain goal  Description: INTERVENTIONS:  - Encourage pt to monitor pain and request assistance  - Assess pain using appropriate pain scale  - Administer analgesics based on type and severity of pain and evaluate response  - Implement non-pharmacological measures as appropriate and evaluate response  - Consider cultural and social influences on pain and pain management  - Manage/alleviate anxiety  - Utilize distraction and/or relaxation techniques  - Monitor for opioid side effects  - Notify MD/LIP if interventions unsuccessful or patient reports new pain  - Anticipate increased pain with activity and pre-medicate as appropriate  Outcome: Adequate for Discharge

## 2024-05-06 NOTE — PAYOR COMM NOTE
--------------  DISCHARGE REVIEW    Payor: [facility]  Subscriber #:  [id number]  Authorization Number: [id number]    Admit date: 5/1/24  Admit time:   2:51 PM  Discharge Date: 5/3/2024  2:31 PM     Admitting Physician: Katharine Ojeda MD  Attending Physician:  No att. providers found  Primary Care Physician: Wilfred Huerta MD

## 2024-05-06 NOTE — DISCHARGE SUMMARY
[facility]  Discharge Summary    K[place] Park Patient Status:  Inpatient    1980 MRN [id number]   Location [facility] 1NE-A Attending No att. providers found   Hosp Day # 2 PCP Wilfred Huerta MD     Date of Admission: 2024    Date of Discharge: 5/3/2024    Admitting Diagnosis: Diverticulitis of large intestine with abscess without bleeding [K57.20]       Patient Active Problem List   Diagnosis    Diverticulitis of large intestine with abscess without bleeding       Reason for Admission:  Diverticulitis of large intestine with abscess without bleeding [K57.20]     Procedures:  None    History of Present Illness: Diverticulitis of large intestine with abscess without bleeding [K57.20]    Kirby Park is a a(n) 44 year old female who presented to the [place] ER with approximately one week of worsening abdominal pain.      The patient states her pain began approximately one week in the lower abdomen. She states her pain is the worst in the left lower quadrant. She describes her pain as a cramping pain. She has been managing her pain with tylenol.      The patient states yesterday, the pain woke her from sleep and she was requiring tylenol every 2-3 hours. Her persistent worsening pain is why she ultimately presented to the emergency department.      She has had some associated nausea. She denies vomiting. She denies fevers or chills. She had some worsening abdominal pain with bowel movements. She is passing flatus.      Upon presentation to the emergency department, CT scan of the abdomen and pelvis revealed acute sigmoid diverticulitis.  There is an area of distinct low-attenuation measuring up to 1.9 cm along the proximal aspect of the diverticulitis segment consistent with a developing intramural or subserosal abscess.  It is not drainable.  Small amount of free fluid in the left lower quadrant and lower midline pelvis.  No free air.  All other significant findings may be  seen in the radiologist report.     The patient is hemodynamically stable.  She is afebrile.  Her vital signs are stable.  Slight leukocytosis at 11.2 with a left shift.  Hemoglobin 14.2.  Platelets 244,000.  AST, ALT and alkaline phosphatase are all elevated at 61, 81 and 143 respectively.  Electrolytes are within normal limits.  Lipase is within normal limits at 33.     The patient has no significant past medical history.     The patient has a past surgical history significant for  section 28 years ago.     Hospital Course: The patient had improvement in her abdominal pain and started to tolerate a diet. She had return of bowel function. The patient is being discharged with the following physical exam.      Physical Exam:  General: Alert, orientated x3.  Cooperative.  No apparent distress.  Vital Signs:  Blood pressure 119/75, pulse 52, temperature 98.1 °F (36.7 °C), temperature source Oral, resp. rate 16, height 64\", weight 177 lb 6.4 oz (80.5 kg), SpO2 97%.  HEENT: Normocephalic, atraumatic. No scleral icterus.  Abdomen:  Soft, non-distended, minimally tender to palpation in left lower quadrant, with no rebound or guarding.  No peritoneal signs.    Consultations: None    Complications: none     Disposition: Home to self care   Discharge Condition: Good    Discharge Medications:   Discharge Medication List as of 5/3/2024  1:01 PM        START taking these medications    Details   amoxicillin clavulanate 875-125 MG Oral Tab Take 1 tablet by mouth 2 (two) times daily for 14 days., Normal, Disp-28 tablet, R-0             Follow up Visits: Follow-up with Dr. Ojeda in 2 weeks.    Other Discharge Instructions:  Recommend following a soft diet for 1 week.  She is will be discharged home with a 2 week course of AugmentLizz alatorre PA  2024  2:57 PM

## 2024-05-13 ENCOUNTER — TELEPHONE (OUTPATIENT)
Facility: LOCATION | Age: 44
End: 2024-05-13

## 2024-05-13 ENCOUNTER — PATIENT MESSAGE (OUTPATIENT)
Facility: LOCATION | Age: 44
End: 2024-05-13

## 2024-05-13 DIAGNOSIS — R10.9 ABDOMINAL PAIN, UNSPECIFIED ABDOMINAL LOCATION: ICD-10-CM

## 2024-05-13 DIAGNOSIS — K57.92 ACUTE DIVERTICULITIS: Primary | ICD-10-CM

## 2024-05-13 NOTE — TELEPHONE ENCOUNTER
Good afternoon, the patient recently called stating that she is experiencing severe levels of pain and she has been taking tylenol to treatment. The patient was wondering if she could be prescribed any other medication because the tylenol is not helping.    Call back # [phone]

## 2024-05-14 NOTE — TELEPHONE ENCOUNTER
S/w with patient. Patient went to work today. She has been alternating with Tylenol and Motrin for pain management.  Patient is getting some relief but theres always pain present.  Patient states \"if I'm feeling too bad while at work, I will leave to get scan.  Patient informed that CT  STAT was ordered for her.  Patient advised that if symptoms of nausea, vomiting, fever, chills develop, she needs to present to ED.  Patient verbalized understanding.

## 2024-05-15 ENCOUNTER — HOSPITAL ENCOUNTER (OUTPATIENT)
Dept: CT IMAGING | Facility: HOSPITAL | Age: 44
Discharge: HOME | End: 2024-05-15

## 2024-05-15 DIAGNOSIS — R10.9 ABDOMINAL PAIN, UNSPECIFIED ABDOMINAL LOCATION: ICD-10-CM

## 2024-05-15 DIAGNOSIS — K57.92 ACUTE DIVERTICULITIS: ICD-10-CM

## 2024-05-15 PROCEDURE — 74177 CT ABD & PELVIS W/CONTRAST: CPT

## 2024-06-06 ENCOUNTER — OFFICE VISIT (OUTPATIENT)
Facility: LOCATION | Age: 44
End: 2024-06-06
Payer: MEDICAID

## 2024-06-06 VITALS — HEART RATE: 57 BPM | OXYGEN SATURATION: 98 % | RESPIRATION RATE: 16 BRPM | TEMPERATURE: 98 F

## 2024-06-06 DIAGNOSIS — K57.20 DIVERTICULITIS OF LARGE INTESTINE WITH ABSCESS WITHOUT BLEEDING: Primary | ICD-10-CM

## 2024-06-06 DIAGNOSIS — K63.89 EPIPLOIC APPENDAGITIS: ICD-10-CM

## 2024-06-06 PROCEDURE — 99213 OFFICE O/P EST LOW 20 MIN: CPT | Performed by: STUDENT IN AN ORGANIZED HEALTH CARE EDUCATION/TRAINING PROGRAM

## 2024-06-06 RX ORDER — POLYETHYLENE GLYCOL 3350, SODIUM CHLORIDE, SODIUM BICARBONATE, POTASSIUM CHLORIDE 420; 11.2; 5.72; 1.48 G/4L; G/4L; G/4L; G/4L
POWDER, FOR SOLUTION ORAL
Qty: 1 EACH | Refills: 0 | Status: SHIPPED | OUTPATIENT
Start: 2024-06-06

## 2024-06-06 NOTE — PROGRESS NOTES
Patient ID: Kirby Park is a 44 year old female.    Chief Complaint   Patient presents with    New Patient     5/1/24- ER f/u. Diverticulitis        HPI: Kirby Park is a 44 year old female presents to clinic for follow-up.  Patient was first seen in the hospital for acute diverticulitis with developing intramural abscess.  She was treated conservatively and was subsequently discharged once her symptoms improved.  Since then, patient did have another episode of abdominal pain.  CT scan done at that time showed epiploic appendagitis near the splenic flexure.  Today, patient presents to clinic for follow-up.  She still reports some intermittent abdominal pain in her left upper quadrant.  She denies any nausea or vomiting.  She is tolerating a diet and having normal bowel movements.  She states that her last colonoscopy was at [facility] approximately 4 to 5 years ago.    Workup:   CT ABDOMEN+PELVIS(CONTRAST ONLY)(CPT=74177)    Result Date: 5/15/2024  CONCLUSION:   1. There is fat stranding at the splenic flexure of the colon surrounding a small region of pericolonic fat.  No definitive inflamed diverticulum is seen.  These imaging findings may represent epiploic appendagitis at the splenic flexure of the colon.  2. On 5/1/2024, there was severe acute diverticulitis at the proximal sigmoid colon.  On today's examination, this diverticulitis has markedly improved.  No mature abscess is seen.  If the patient's symptoms persist or worsen, repeat imaging would be recommended.  3. There is bladder wall thickening which may relate to underdistention or cystitis.  Consider correlation with urinalysis.    The radiology support staff is in the process of contacting the referring physician regarding this report.   LOCATION:  [facility]   Dictated by (CST): Stromberg, LeRoy, MD on 5/15/2024 at 5:55 PM     Finalized by (CST): Stromberg, LeRoy, MD on 5/15/2024 at 6:04 PM       CT ABDOMEN+PELVIS(CONTRAST  ONLY)(CPT=74177)    Result Date: 2024  CONCLUSION:   1. Acute sigmoid diverticulitis.  The inflammatory changes are extensive, and there is an area of distinct low-attenuation measuring up to 1.9 cm along the proximal aspect of the diverticulitis segment which may reflect a developing intramural or subserosal abscess, but no large or percutaneous-drainable appearing abscess formation at this time.   2. Small-moderate free fluid in the left lower quadrant and lower midline pelvis, amid numerous loops of small bowel.  There may be some early membrane enhancement of this fluid, and potential for early organization, but at this time there is no sign of defined abscess, or thick-walled collection, or gas bubbles within the fluid.  3. Urinary bladder is not well distended with urine, but shows wall thickening and may be inflamed secondary to the adjacent diverticulitis.  4. Nonobstructing bilateral kidney stones.  IUD.  Heterogeneous attenuation of the uterus may reflect subtle fibroid changes.    LOCATION:  [facility]   Dictated by (CST): Sven Almanzar MD on 2024 at 10:15 AM     Finalized by (CST): Sven Almanzar MD on 2024 at 10:21 AM          Past Medical History  History reviewed. No pertinent past medical history.    Past Surgical History  Past Surgical History:   Procedure Laterality Date             Medications  Current Outpatient Medications   Medication Sig Dispense Refill    PEG 3350-KCl-Na Bicarb-NaCl (TRILYTE) 420 g Oral Recon Soln Starting at 4:00 pm the night before procedure, drink 8 ounces of the prep every 15-20 minutes until finished 1 each 0       Allergies  No Known Allergies    Social History  History   Smoking Status    Never   Smokeless Tobacco    Never     History   Alcohol Use    Yes     Comment: social     History   Drug Use Unknown       Family History  History reviewed. No pertinent family history.    Review of Systems  Review of Systems   Constitutional: Negative.     Respiratory: Negative.     Cardiovascular: Negative.    Gastrointestinal:  Negative for abdominal distention, abdominal pain, constipation, nausea and vomiting.       Exam  Vitals:    06/06/24 1355   Pulse: 57   Resp: 16   Temp: 98 °F (36.7 °C)     Physical Exam  Constitutional:       Appearance: Normal appearance.   Cardiovascular:      Rate and Rhythm: Normal rate.   Pulmonary:      Effort: Pulmonary effort is normal.   Abdominal:      General: Abdomen is flat. There is no distension.      Palpations: Abdomen is soft.      Tenderness: There is no abdominal tenderness.   Skin:     General: Skin is warm and dry.   Neurological:      Mental Status: She is alert and oriented to person, place, and time.           Assessment/Plan  Assessment   Problem List Items Addressed This Visit          Gastrointestinal and Abdominal    Diverticulitis of large intestine with abscess without bleeding - Primary    Relevant Medications    PEG 3350-KCl-Na Bicarb-NaCl (TRILYTE) 420 g Oral Recon Soln     Other Visit Diagnoses       Epiploic appendagitis        Relevant Medications    PEG 3350-KCl-Na Bicarb-NaCl (TRILYTE) 420 g Oral Recon Soln            Kirstieana lilia Park is a 44 year old female with recent episode of diverticulitis    This was patient's second episode of diverticulitis  She reports having a similar episode approximately 10 years ago  Both CT images were reviewed personally by me and with the patient  The first CT scan shows acute diverticulitis with developing intramural abscess but no perforation or drainable fluid collection  The second CT scan shows improvement of the diverticulitis of the sigmoid colon but epiploic appendagitis of the colon near the splenic flexure    Patient is recovering from her diverticulitis  She has completed the course of antibiotics  She is tolerating diet and having normal bowel movements  This was her second episode within 10 years, I recommend continued conservative  management  Patient's last colonoscopy was 4 to 5 years ago no results in the computer  I recommend moving forward with repeat colonoscopy to rule out any other potential etiologies for the diverticulitis  Procedure was explained to the patient  Risks including bleeding, pain, infection, perforation, need for further intervention all discussed  Patient acknowledged understanding and wishes to proceed  She will be due for colonoscopy in August or later  Will schedule patient closer to that time    Patient continues to have some pain near the left upper quadrant which is consistent with the epiploic appendagitis  I discussed treated supportively  She can continue to take ibuprofen as needed for pain and this will eventually resolve    If patient has any other episodes of pain that is concerning for an episode of diverticulitis, she should call my office  Patient will need repeat CT imaging and another course of antibiotics    She can call my office for any other questions or concerns      Katharine Ojeda MD  General Surgery  [facility]     CC:  Wilfred Huerta MD

## 2024-11-11 ENCOUNTER — TELEPHONE (OUTPATIENT)
Facility: LOCATION | Age: 44
End: 2024-11-11